# Patient Record
Sex: MALE | Race: WHITE | URBAN - METROPOLITAN AREA
[De-identification: names, ages, dates, MRNs, and addresses within clinical notes are randomized per-mention and may not be internally consistent; named-entity substitution may affect disease eponyms.]

---

## 2022-02-01 ENCOUNTER — NEW PATIENT (OUTPATIENT)
Dept: URBAN - METROPOLITAN AREA CLINIC 79 | Facility: CLINIC | Age: 22
End: 2022-02-01

## 2022-02-01 DIAGNOSIS — H52.223: ICD-10-CM

## 2022-02-01 DIAGNOSIS — H04.123: ICD-10-CM

## 2022-02-01 PROCEDURE — MISCOPTOS MISCELLANEOUS, OPTOS

## 2022-02-01 PROCEDURE — 92015 DETERMINE REFRACTIVE STATE: CPT

## 2022-02-01 PROCEDURE — 99204 OFFICE O/P NEW MOD 45 MIN: CPT

## 2022-02-01 ASSESSMENT — VISUAL ACUITY
OS_SC: 20/20
OS_CC: 20/20
OD_SC: 20/20
OD_CC: 20/20

## 2022-02-01 ASSESSMENT — TONOMETRY
OD_IOP_MMHG: 18
OS_IOP_MMHG: 14

## 2023-07-14 ENCOUNTER — ESTABLISHED COMPREHENSIVE EXAM (OUTPATIENT)
Dept: URBAN - METROPOLITAN AREA CLINIC 79 | Facility: CLINIC | Age: 23
End: 2023-07-14

## 2023-07-14 DIAGNOSIS — H52.223: ICD-10-CM

## 2023-07-14 DIAGNOSIS — H04.123: ICD-10-CM

## 2023-07-14 PROCEDURE — MISCOPTOS MISCELLANEOUS, OPTOS

## 2023-07-14 PROCEDURE — 99214 OFFICE O/P EST MOD 30 MIN: CPT

## 2023-07-14 PROCEDURE — 92015 DETERMINE REFRACTIVE STATE: CPT

## 2023-07-14 ASSESSMENT — TONOMETRY
OD_IOP_MMHG: 14
OS_IOP_MMHG: 13

## 2023-07-14 ASSESSMENT — VISUAL ACUITY
OD_SC: J1+
OS_SC: J1+
OD_SC: 20/20
OS_SC: 20/20-1

## 2024-01-05 ENCOUNTER — CONSULT (OUTPATIENT)
Dept: GASTROENTEROLOGY | Facility: CLINIC | Age: 24
End: 2024-01-05
Payer: COMMERCIAL

## 2024-01-05 VITALS
SYSTOLIC BLOOD PRESSURE: 122 MMHG | BODY MASS INDEX: 28.93 KG/M2 | DIASTOLIC BLOOD PRESSURE: 74 MMHG | HEIGHT: 78 IN | WEIGHT: 250 LBS

## 2024-01-05 DIAGNOSIS — K62.5 RECTAL BLEEDING: Primary | ICD-10-CM

## 2024-01-05 DIAGNOSIS — R10.9 ABDOMINAL CRAMPING: ICD-10-CM

## 2024-01-05 PROCEDURE — 99203 OFFICE O/P NEW LOW 30 MIN: CPT | Performed by: STUDENT IN AN ORGANIZED HEALTH CARE EDUCATION/TRAINING PROGRAM

## 2024-01-05 NOTE — PROGRESS NOTES
Consultation - [facility]     Kezia Dunlap 23 y.o. male MRN: [id number]  Unit/Bed#:  Encounter: [id number]    Consults    ASSESSMENT and PLAN    1. Rectal bleeding  Recent illness with bloody diarrhea, weakness and fatigue.  He was seen at [place] [facility] for reportedly his lab and stool testing was unremarkable.  He was started on 2 medications, suspect antibiotics and has gradually improved since then, now more or less back to his baseline.  Suspect he had infectious diarrheal illness given the acuity and timeline of resolution.  Discussed that the other consideration would be IBD but would be unusual to stop on its own.  Will check CRP and fecal calprotectin.  Plan to see patient back in 2 months and if stable, would come back as needed.  However if testing is abnormal or if he has recurrence of symptoms would consider colonoscopy.    - C-reactive protein; Future  - Calprotectin,Fecal; Future  - C-reactive protein  - Calprotectin,Fecal    2. Abdominal cramping  In setting of bloody diarrhea, suspect infectious illness.  Now resolving.  Plan as above.  - C-reactive protein; Future  - Calprotectin,Fecal; Future  - C-reactive protein  - Calprotectin,Fecal    Follow-up 2 months    Chief Complaint   Patient presents with    Diarrhea, blood in the stools       Physician Requesting Consult: No att. providers found    HPI  Kezia Dunlap is a 23 y.o. year old male with a past medical history of Gilbert's syndrome who presents for evaluation of recent bloody diarrhea and mucus.  He was recently in [place] for 4 months returned on December 14.  On his last day of his [place] trip he started feeling weak and developed diarrhea.  He started having some blood in his stool described as low-volume.  He did not have recent travel to developing regions, foul tasting food, raw food or other risk factors.  Wanted to other people had some GI illness but not as severe symptoms.  He went to the emergency  "department at [facility] on December 15 where he had a bloody/mucousy stool which was sampled.  Reportedly culture negative.  Was started on 2 medications, suspect antibiotics and thereafter began having relief.  He also reports his blood testing at the ER was normal.    Since then he has gradually improved.  At the height of his illness he was having bowel movements 3 times daily, now down to once daily and they are more or less formed.  There is no further abdominal cramping.    No regular NSAID use.  He does vape. Was using ETOH in [place], but not a usual regular ETOH user, none since he got back. No family members with history of colon cancer, stomach cancer, IBD.    GI History:  Prior Colonoscopy: No prior colonoscopy  Prior EGD:  No prior EGD  Family hx:  No reported first degree relatives with colorectal cancer  Surgical hx: No prior abdominal surgeries  Blood thinners: Denies antiplatelet or anticoagulation use  NSAID use: Denies regular NSAID use  DM meds: None  Social Hx: No regular ETOH, smoking, or drug use.          Historical Information   History reviewed. No pertinent past medical history.  History reviewed. No pertinent surgical history.  Social History   Social History     Substance and Sexual Activity   Alcohol Use Yes     Social History     Substance and Sexual Activity   Drug Use Not on file     Social History     Tobacco Use   Smoking Status Former    Types: Cigarettes   Smokeless Tobacco Never     Family History   Problem Relation Age of Onset    Colon cancer Neg Hx     Colon polyps Neg Hx        Meds/Allergies     No current facility-administered medications for this visit.     (Not in a hospital admission)      Allergies   Allergen Reactions    Cat Hair Extract Other (See Comments)       PHYSICAL EXAM    Visit Vitals  /74   Ht 6' 10\" (2.083 m)   Wt 113 kg (250 lb)   BMI 26.14 kg/m²   Smoking Status Former   BSA 2.57 m²     Body mass index is 26.14 kg/m².  General Appearance: " "NAD, cooperative, alert  Eyes: Anicteric, EOMI  ENT: Normocephalic, atraumatic, normal mucosa  Neck: symmetrical, trachea midline  Lungs: clear to auscultation, non-labored respirations on room air, no wheezing  CV: S1 S2+ radial pulses bilaterally  GI:  Soft, non-tender, non-distended; normal bowel sounds; no masses, no organomegaly   Rectal: Deferred  Musculoskeletal: No gross deformities or pitting edema  Skin:  No jaundice, no rashes  Neurologic: awake, alert, oriented, no gross deficits    No results found for: \"GLUCOSE\", \"CALCIUM\", \"NA\", \"K\", \"CO2\", \"CL\", \"BUN\", \"CREATININE\"  No results found for: \"WBC\", \"HGB\", \"MCV\", \"PLT\"  No results found for: \"ALT\", \"AST\", \"GGT\", \"ALKPHOS\", \"TBILI\"  No results found for: \"AMYLASE\"  No results found for: \"LIPASE\"  No results found for: \"IRON\", \"TIBC\", \"FERRITIN\"  No results found for: \"INR\"    No results found.    Imaging Studies: I have personally reviewed pertinent reports.      Pathology, and Other Studies: I have personally reviewed pertinent reports.      REVIEW OF SYSTEMS    CONSTITUTIONAL: negative unless stated in HPI  GASTROINTESTINAL: As noted in the HPI        "

## 2024-01-05 NOTE — PATIENT INSTRUCTIONS
We will check a stool test and blood test to help rule out a process such as inflammatory bowel disease (ulcerative colitis, Crohn's). If you have any recurrence of abdominal pain, bleeding or diarrhea, please let me know. Otherwise we'll see you back in 2 months.

## 2024-03-13 ENCOUNTER — TELEPHONE (OUTPATIENT)
Dept: GASTROENTEROLOGY | Facility: CLINIC | Age: 24
End: 2024-03-13

## 2024-07-18 ENCOUNTER — HOSPITAL ENCOUNTER (EMERGENCY)
Dept: HOSPITAL 99 - EMR | Age: 24
Discharge: HOME | End: 2024-07-18
Payer: COMMERCIAL

## 2024-07-18 VITALS — RESPIRATION RATE: 18 BRPM

## 2024-07-18 VITALS — SYSTOLIC BLOOD PRESSURE: 154 MMHG | DIASTOLIC BLOOD PRESSURE: 74 MMHG

## 2024-07-18 VITALS — DIASTOLIC BLOOD PRESSURE: 67 MMHG | SYSTOLIC BLOOD PRESSURE: 125 MMHG

## 2024-07-18 VITALS — BODY MASS INDEX: 27.4 KG/M2

## 2024-07-18 DIAGNOSIS — H53.8: Primary | ICD-10-CM

## 2024-07-18 LAB
ALBUMIN SERPL-MCNC: 4.9 G/DL (ref 3.5–5)
ALP SERPL-CCNC: 69 U/L (ref 38–126)
ALT SERPL-CCNC: 30 U/L (ref 0–50)
AST SERPL-CCNC: 30 U/L (ref 17–59)
BUN SERPL-MCNC: 14 MG/DL (ref 9–20)
CALCIUM SERPL-MCNC: 10 MG/DL (ref 8.4–10.2)
CHLORIDE SERPL-SCNC: 102 MMOL/L (ref 98–107)
CO2 SERPL-SCNC: 29 MMOL/L (ref 22–30)
CRP SERPL-MCNC: < 5 MG/L (ref 0–10)
EGFR: > 60
ERYTHROCYTE [DISTWIDTH] IN BLOOD BY AUTOMATED COUNT: 12.3 % (ref 11.5–14.5)
ESTIMATED CREATININE CLEARANCE: > 125 ML/MIN
GLUCOSE SERPL-MCNC: 75 MG/DL (ref 70–99)
HCT VFR BLD AUTO: 41.5 % (ref 39–52)
HGB BLD-MCNC: 14.4 G/DL (ref 13–18)
MCHC RBC AUTO-ENTMCNC: 34.7 G/DL (ref 33–37)
MCV RBC AUTO: 84.9 FL (ref 80–94)
NRBC BLD AUTO-RTO: 0 %
PLATELET # BLD AUTO: 275 10^3/UL (ref 130–400)
POTASSIUM SERPL-SCNC: 4.1 MMOL/L (ref 3.5–5.1)
PROT SERPL-MCNC: 7.8 G/DL (ref 6.3–8.2)
SODIUM SERPL-SCNC: 139 MMOL/L (ref 135–145)

## 2024-07-18 PROCEDURE — 99284 EMERGENCY DEPT VISIT MOD MDM: CPT

## 2024-07-22 LAB — LYME ANTIBODY SCREEN, EIA: (no result)

## 2025-01-03 ENCOUNTER — HOSPITAL ENCOUNTER (OUTPATIENT)
Dept: HOSPITAL 99 - RCS | Age: 25
End: 2025-01-03
Payer: COMMERCIAL

## 2025-01-03 DIAGNOSIS — R00.2: Primary | ICD-10-CM

## 2025-08-20 ENCOUNTER — PROBLEM (OUTPATIENT)
Dept: URBAN - METROPOLITAN AREA CLINIC 79 | Facility: CLINIC | Age: 25
End: 2025-08-20

## 2025-08-20 DIAGNOSIS — H00.16: ICD-10-CM

## 2025-08-20 DIAGNOSIS — H04.123: ICD-10-CM

## 2025-08-20 DIAGNOSIS — H52.223: ICD-10-CM

## 2025-08-20 DIAGNOSIS — H53.2: ICD-10-CM

## 2025-08-20 DIAGNOSIS — H53.10: ICD-10-CM

## 2025-08-20 PROCEDURE — 92014 COMPRE OPH EXAM EST PT 1/>: CPT

## 2025-08-20 PROCEDURE — 92025 CPTRIZED CORNEAL TOPOGRAPHY: CPT | Mod: NC

## 2025-08-20 ASSESSMENT — VISUAL ACUITY
OS_SC: 20/20
OD_SC: 20/20

## 2025-08-20 ASSESSMENT — TONOMETRY
OD_IOP_MMHG: 14
OS_IOP_MMHG: 15